# Patient Record
Sex: FEMALE | Race: WHITE | URBAN - METROPOLITAN AREA
[De-identification: names, ages, dates, MRNs, and addresses within clinical notes are randomized per-mention and may not be internally consistent; named-entity substitution may affect disease eponyms.]

---

## 2020-01-02 ENCOUNTER — OFFICE (OUTPATIENT)
Dept: URBAN - METROPOLITAN AREA CLINIC 78 | Facility: CLINIC | Age: 75
End: 2020-01-02

## 2020-01-02 VITALS
TEMPERATURE: 98.4 F | WEIGHT: 135 LBS | DIASTOLIC BLOOD PRESSURE: 67 MMHG | SYSTOLIC BLOOD PRESSURE: 104 MMHG | HEART RATE: 66 BPM | HEIGHT: 64 IN

## 2020-01-02 DIAGNOSIS — R63.0 ANOREXIA: ICD-10-CM

## 2020-01-02 DIAGNOSIS — Z98.84 BARIATRIC SURGERY STATUS: ICD-10-CM

## 2020-01-02 DIAGNOSIS — R93.5 ABNORMAL FINDINGS ON DIAGNOSTIC IMAGING OF OTHER ABDOMINAL R: ICD-10-CM

## 2020-01-02 DIAGNOSIS — R63.4 ABNORMAL WEIGHT LOSS: ICD-10-CM

## 2020-01-02 DIAGNOSIS — Z86.010 PERSONAL HISTORY OF COLONIC POLYPS: ICD-10-CM

## 2020-01-02 PROCEDURE — 99204 OFFICE O/P NEW MOD 45 MIN: CPT

## 2020-01-02 NOTE — SERVICEHPINOTES
75 yo female with h/o gastric bypass (circa 2009) presents with abnormal CT findings. Her  is with her. Reports dry heaves a couple of months ago. She has been having more gas and belching issues as well. She had dry heaves again recently. 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 She feels relatively well reports some reduced appetite but says she is eating pretty well, though has lost some weight which she isn't too concerned about. She was recently started on Aricept for dementia.  Last EGD was in 2016 showed erythema at anastomosis and post anastomotic site. No ulcers seen but bypass appeared narrow. No stricture. Biopsy of stomach wnl but anastomosis with severe inflammation. A hiatal hernia was noted. In 2013 she had an EGD revealing large, shallow ulcer with surrounding inflammation within the anastomotic area and perhaps slight narrowing at the outlet to the residual stomach. Biopsies confirmed the inflammation in the area of the anastomosis, while biopsies of the stomach remnant and distal esophagus were essentially unremarkable.Last colonoscopy was in January 2009 with adenomatous polyp. She reports normal bowel habits for the most part, though can have loose stools at times. Denies constipation.  Denies h/o heart disease.

## 2020-01-23 ENCOUNTER — ON CAMPUS - OUTPATIENT (OUTPATIENT)
Dept: URBAN - METROPOLITAN AREA HOSPITAL 63 | Facility: HOSPITAL | Age: 75
End: 2020-01-23

## 2020-01-23 DIAGNOSIS — R63.0 ANOREXIA: ICD-10-CM

## 2020-01-23 DIAGNOSIS — Z98.84 BARIATRIC SURGERY STATUS: ICD-10-CM

## 2020-01-23 DIAGNOSIS — R63.4 ABNORMAL WEIGHT LOSS: ICD-10-CM

## 2020-01-23 PROCEDURE — 43239 EGD BIOPSY SINGLE/MULTIPLE: CPT

## 2020-02-25 ENCOUNTER — ON CAMPUS - OUTPATIENT (OUTPATIENT)
Dept: URBAN - METROPOLITAN AREA HOSPITAL 63 | Facility: HOSPITAL | Age: 75
End: 2020-02-25
Payer: COMMERCIAL

## 2020-02-25 DIAGNOSIS — Z86.010 PERSONAL HISTORY OF COLONIC POLYPS: ICD-10-CM

## 2020-02-25 DIAGNOSIS — D12.0 BENIGN NEOPLASM OF CECUM: ICD-10-CM

## 2020-02-25 DIAGNOSIS — D12.3 BENIGN NEOPLASM OF TRANSVERSE COLON: ICD-10-CM

## 2020-02-25 PROCEDURE — 45385 COLONOSCOPY W/LESION REMOVAL: CPT | Mod: PT
